# Patient Record
(demographics unavailable — no encounter records)

---

## 2024-10-29 NOTE — PHYSICAL EXAM
[Healthy Appearing] : healthy appearing [No Acute Distress] : no acute distress [Sclera] : the sclera and conjunctiva were normal [Hearing Threshold Finger Rub Not [place]] : hearing was normal [Normal Appearance] : the appearance of the neck was normal [No Respiratory Distress] : no respiratory distress [Normal] : normal bowel sounds, non-tender, no masses, soft, no no hepato-splenomegaly [Abnormal Walk] : normal gait [Normal Color / Pigmentation] : normal skin color and pigmentation [Motor Exam] : the motor exam was normal [Oriented To Time, Place, And Person] : oriented to person, place, and time

## 2024-10-29 NOTE — PHYSICAL EXAM
[Healthy Appearing] : healthy appearing [No Acute Distress] : no acute distress [Sclera] : the sclera and conjunctiva were normal [Hearing Threshold Finger Rub Not [place]] : hearing was normal [Normal Appearance] : the appearance of the neck was normal [No Respiratory Distress] : no respiratory distress [Normal] : normal bowel sounds, non-tender, no masses, soft, no no hepato-splenomegaly [Abnormal Walk] : normal gait [Normal Color / Pigmentation] : normal skin color and pigmentation [Motor Exam] : the motor exam was normal [Oriented To Time, Place, And Person] : oriented to person, place, and time

## 2024-10-30 NOTE — ASSESSMENT
"Anesthesia Release from PACU Note    Patient: Clay Marcos    Procedure(s) Performed: Procedure(s) (LRB):  ESOPHAGOGASTRODUODENOSCOPY (EGD) (N/A)    Anesthesia type: MAC    Post pain: Adequate analgesia    Post assessment: no apparent anesthetic complications, tolerated procedure well and no evidence of recall    Last Vitals:   Visit Vitals    /76 (BP Location: Right leg, Patient Position: Lying, BP Method: Automatic)    Pulse 77    Temp 36.5 °C (97.7 °F) (Oral)    Resp 20    Ht 6' 0.5" (1.842 m)    Wt 107 kg (236 lb)    SpO2 (!) 94%    BMI 31.57 kg/m2       Post vital signs: stable    Level of consciousness: awake    Nausea/Vomiting: no nausea/no vomiting    Complications: none    Airway Patency: patent    Respiratory: unassisted, spontaneous ventilation, room air    Cardiovascular: stable    Hydration: euvolemic  " [FreeTextEntry1] : EGD/Colonoscopy with Dr. Rios (prefers Croatian speaking provider) - Indications: screening, GERD - Reviewed importance of adequate colon cleansing prior to colonoscopy. Instructed to contact office if he/she has any questions regarding prep. - Explained the risks (including but not limited to cardiopulmonary anesthetic complications, bleeding, perforation, aspiration, missed lesions and misidentified sites of lesions - complications that might necessitate hospitalization or surgery), benefits and alternatives of colonoscopy were reviewed with the patient. Preparation for the procedure was reviewed with the patient. The patient was informed that she/he would be given intravenous anesthesia by an anesthesiologist for the procedure. The patient was informed that a family member or friend must drive the patient following recovery from the procedure. Patient understands and agrees, all questions answered. - Holds: none - Prep: Standard  GERD - Start omeprazole once daily.  - Reviewed lifestyle modifications such as a weight loss and dietary changes. Avoid or limit foods that cause symptoms, which may be excessive caffeine, chocolate, alcohol, peppermint, red sauce, spicy foods and fatty foods. Avoid late night eating and snacking. Avoid large meals. -Use OTC antacids/alginates for intermittent symptoms of reflux such as Tums, Reflux Gourmet or Gaviscon.

## 2024-10-30 NOTE — HISTORY OF PRESENT ILLNESS
[FreeTextEntry1] :  Language Line 's Name: Varun 's ID #: [id number] Language: Sao Tomean   Ms. BRIT TEE is a 49 year old female PMH BROEDRICK on CPAP, depression, HLD, migraine.   Patient presents for pre-colonoscopy evaluation. Referred to practice by PCP at [facility].  Last EGD/colonoscopy in 2019 at [facility] (Dx diverticulitis and GERD), notable for colon polyps, was instructed to repeat in 5 years.  Prior CRC screening such as Cologuard, FIT test, CT colonography: No   Denies change in bowel habits, constipation, N/V/D, rectal bleeding, abdominal pain, bloating, unintentional weight loss, early satiety, dysphagia. Denies heartburn or reflux on a regular basis. Reports acid reflux 1-2 times per week, will take omeprazole as needed rather than regularly. She avoids acidic, spicy or fatty foods. Denies hematochezia or melena.  BM every 1-2 days; soft, brown stools that are easily passed.   Family history: Denies family history of colon cancer or advanced colorectal polyps. Maternal grandfather had gastric cancer.  Denies family history of IBD or celiac disease.   Social: Smoking history: Never Alcohol consumption: Occasional Marijuana use: None Other recreational drug use: None

## 2024-10-30 NOTE — HISTORY OF PRESENT ILLNESS
[FreeTextEntry1] :  Language Line 's Name: Varun 's ID #: [id number] Language: Egyptian   Ms. BRIT TEE is a 49 year old female PMH BRODERICK on CPAP, depression, HLD, migraine.   Patient presents for pre-colonoscopy evaluation. Referred to practice by PCP at [facility].  Last EGD/colonoscopy in 2019 at [facility] (Dx diverticulitis and GERD), notable for colon polyps, was instructed to repeat in 5 years.  Prior CRC screening such as Cologuard, FIT test, CT colonography: No   Denies change in bowel habits, constipation, N/V/D, rectal bleeding, abdominal pain, bloating, unintentional weight loss, early satiety, dysphagia. Denies heartburn or reflux on a regular basis. Reports acid reflux 1-2 times per week, will take omeprazole as needed rather than regularly. She avoids acidic, spicy or fatty foods. Denies hematochezia or melena.  BM every 1-2 days; soft, brown stools that are easily passed.   Family history: Denies family history of colon cancer or advanced colorectal polyps. Maternal grandfather had gastric cancer.  Denies family history of IBD or celiac disease.   Social: Smoking history: Never Alcohol consumption: Occasional Marijuana use: None Other recreational drug use: None

## 2024-10-30 NOTE — ASSESSMENT
[FreeTextEntry1] : EGD/Colonoscopy with Dr. Rios (prefers Serbian speaking provider) - Indications: screening, GERD - Reviewed importance of adequate colon cleansing prior to colonoscopy. Instructed to contact office if he/she has any questions regarding prep. - Explained the risks (including but not limited to cardiopulmonary anesthetic complications, bleeding, perforation, aspiration, missed lesions and misidentified sites of lesions - complications that might necessitate hospitalization or surgery), benefits and alternatives of colonoscopy were reviewed with the patient. Preparation for the procedure was reviewed with the patient. The patient was informed that she/he would be given intravenous anesthesia by an anesthesiologist for the procedure. The patient was informed that a family member or friend must drive the patient following recovery from the procedure. Patient understands and agrees, all questions answered. - Holds: none - Prep: Standard  GERD - Start omeprazole once daily.  - Reviewed lifestyle modifications such as a weight loss and dietary changes. Avoid or limit foods that cause symptoms, which may be excessive caffeine, chocolate, alcohol, peppermint, red sauce, spicy foods and fatty foods. Avoid late night eating and snacking. Avoid large meals. -Use OTC antacids/alginates for intermittent symptoms of reflux such as Tums, Reflux Gourmet or Gaviscon.

## 2024-11-19 NOTE — HISTORY OF PRESENT ILLNESS
[Date: ___] : Date of most recent diagnostic polysomnogram: [unfilled] [AHI: ___ per hour] : Apnea-hypopnea index:  [unfilled] per hour [FreeTextEntry1] : 49 year old woman with history of migraine is here in the sleep center to address sleep apnea.  symptoms prior to cpap - Patient is sleepy with Waverly sleepiness score of 14.  Patient has very loud snoring, also has witnessed apneas.  Patient's bedtime is around 10 PM wakes up in the morning around 6 AM.   She feels tired when she wakes up.  Patient drinks 2-4 cups of coffee during the daytime. Patient does not have any headaches or nocturia. She is sleepy while driving. STOPBANG score - 3 neck size - 15 inches  symptoms on the cpap - Patient is not sleepy with Waverly sleepiness score of 4.  Patient does not snore with cpap, no apneas with cpap.  Patient's bedtime is around 10 PM wakes up in the morning around 6 AM.   She feels rested when she wakes up.  Patient drinks 2-4 cups of coffee during the daytime. Patient does not have any headaches or nocturia. She is not sleepy while driving.  Patient continues to have problems using the cpap, She is using less than 4 hrs.

## 2025-01-07 NOTE — HISTORY OF PRESENT ILLNESS
[Improved Health] : Improved health [Quality of Life] : Quality of life [Improved Mobility] : Improved mobility [FreeTextEntry1] : Medical Hx: depression/anxiety, OS (cpap), GERD, Migraine, Dyslipidemia, Bell's palsy  Surgical Hx: ovarian cyst removal    Started struggling with weight about 2 years ago.  Past Wt Loss Attempts: Lifestyle  Topiramate 25 mg BID --neuro for HA Gained about 20 lbs in past yr.    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

## 2025-01-07 NOTE — ASSESSMENT
[FreeTextEntry1] :  Counseled re weight management.  Dietary Modification Education provided. Recommend a diet high in vegetables intake & lean protein, low in carbohydrates. Discussed the healthy plate model and ways to help with portion control.  Physical Activity- recommend aerobic exercise 3 times per week for 30-45 mins, recommend incorporating strength training 3 times per week; has equipment at home. Use youtube videos.  Labs- ordered fasting labs Medication- pt meets criteria to initiate medication treatment of obesity. Consider injectables if covered by insurance.  Discussed action, s/e, contraindications, storage and injection technique.    RTO in 3 weeks

## 2025-01-23 NOTE — PHYSICAL EXAM
[FreeTextEntry1] : General: No acute distress, Awake, Alert.    Mental status   Awake, alert, and oriented to person, time and place, Normal attention span and concentration, Recent and remote memory intact, Language intact, Fund of knowledge intact.   Delayed recall 3/3     Cranial Nerves    II: VFF    III, IV, VI: PERRL, EOMI.   V: Facial sensation is normal B/L.  VII: Slight left NLF flattening  VIII: Gross hearing is intact.    IX, X: Palate is midline and elevates symmetrically.    XI: Trapezius normal strength.    XII: Tongue midline without atrophy or fasciculations.        Motor exam    Muscle tone - no evidence of rigidity or resistance in all 4 extremities.   No atrophy or fasciculations   Muscle Strength: arms and legs, proximal and distal flexors and extensors are normal    No UE drift.       Reflexes   All present, normal, and symmetrical.  Patellar b/l hyperreflexia No clonus, No Hoffmans    Coordination   Finger to nose: Normal.   Heel to shin: Normal.       Sensory   Intact sensation to PP, vibration and cold to arms and legs    Gait   Normal including heels, toes, and tandem gait.  Romberg negative

## 2025-01-23 NOTE — DATA REVIEWED
[de-identified] : MRI Brain 6/11/24-  This exam is compared prior brain MRI performed on December 28, 2023.   The lateral ventricles have a normal contrasted   Prominent cystic area is seen involving the left sublenticular region which could be compatible with a prominent VR space. This appears stable when compared with the prior exam.   No acute hemorrhage mass or mass effect seen.   Cavum vellum interpositum is seen and unchanged   Abnormal area susceptibility involving the high medial right frontal cortex is seen and unchanged. This could be compatible area of old hemorrhage mineralization or small cavernous angiomas   The large vessels demonstrate normal flow voids   The visualized paranasal sinuses mastoid and middle ear regions appear clear.   IMPRESSION: Stable exam.  MRI Brain 12/27/23- Findings: There is no acute infarct. There is no evidence of mass or intracranial hemorrhage. Ventricles and sulci are normal in size and configuration for the patient's stated age. No midline shift or other significant mass effect is noted. Few scattered tiny cerebral white matter FLAIR hyperintensities are nonspecific, although likely due to chronic microangiopathy. Gray-white differentiation is maintained throughout. A prominent perivascular space is seen in the left basal ganglia. Normal flow voids are maintained in the dominant arterial and venous structures.  There is a trace right mastoid air cell effusion, the remaining paranasal sinuses and tympanomastoid spaces are clear. Orbits and orbital contents are unremarkable.  IMPRESSION:  No acute infarct. [de-identified] : 3/5/24- At home sleep study showed evidence of mild degree of obstructive sleep apnea  MRI Cervical Spine 3/18/24- FINDINGS: The cervical cord is normal in signal. Vertebral body heights maintained. There is straightening of the normal cervical lordosis. Alignment is otherwise normal.   There are minimal disc bulges from C4-C5 through C6/C7. There are no disc herniations at any level. There is no spinal canal or foraminal narrowing at any level.   There is no paraspinal muscle atrophy or edema.   IMPRESSION: Minimal disc bulges from C4-C5 through C6/C7. No disc herniations. No spinal canal or foraminal narrowing at any level.  12/27/23- CT Head- FINDINGS: A 7 mm hypodensity within left basal ganglia which appears have been present on the study of 11/16/2013 is consistent with a perivascular space. No large areas of infarction appreciated. No evidence of acute intracranial hemorrhage. No midline shift or focal mass effect. Basal cisterns are patent. Globes symmetric and intact. No retrobulbar hematoma or proptosis. Paranasal sinuses and mastoids are well-pneumatized. The calvaria is intact.  IMPRESSION: A 7 mm hypodense focus within the left basal ganglia was present on a prior CT head study dating back to 11/16/2013 and is consistent with a perivascular space.  No intracranial hemorrhage, midline shift or hydrocephalus.  CTA Head/ Neck- FINDINGS:  CTA NECK: The right common carotid artery is normal in course and caliber. There is no hemodynamically significant stenosis of the right internal carotid artery. The right external carotid artery is also patent.  The left common carotid artery is normal in course and caliber. There is no hemodynamically significant stenosis of the left internal carotid artery. The left external carotid artery is also patent.  The extracranial segments of the  vertebral arteries are patent.   CTA HEAD: The bilateral internal carotid arteries are patent. The middle cerebral and anterior cerebral arteries are patent.   Prominent bilateral posterior communicating arteries supply the bilateral PCA territory.  Distal bilateral vertebral arteries are patent. There is a diminutive basilar artery. The basilar artery gives out right and left SCA branches and a hypoplastic right P1 segment as the right and left PCA territory is supplied by prominent bilateral posterior communicating arteries consistent with persistent bilateral fetal PCAs.  Included dural venous sinuses are well opacified with contrast. Arachnoid granulation at the right transverse sinus is noted.  The included portions of the cerebral parenchyma are grossly normal in appearance.  IMPRESSION: No high-grade steno-occlusive disease.  No evidence of arterial dissection.   12/28/23- TTE  CONCLUSIONS:  1. Left ventricular systolic function is normal with an ejection fraction visually estimated at 60 to 65 %. 2. Normal left ventricular diastolic function. 3. Normal right ventricular cavity size and systolic function. 4. The left atrium is normal. 5. The right atrium is normal in size. 6. No significant valvular disease. 7. No pericardial effusion seen. 8. Agitated saline injection was negative for intracardiac shunt. 9. No prior echocardiogram is available for comparison.  Labs- 12/27/23- , triglycerides 207 Hemoglobin A1C 5.9

## 2025-01-23 NOTE — HISTORY OF PRESENT ILLNESS
[FreeTextEntry1] : 2/5/24 Carlos Manuel Christine is a 49 yo F with PMH of neurocysticercosis (2007), cerebral hemangioma, migraines, and depression here for hospital follow-up. Accompanied with daughter who is  for Chinese- offered to use licensed  but prefers to use family for this visit. Presented to [facility] on 12/27 for right facial pain, tingling and left facial droop. Patient started to develop right facial pain and tingling about 5 days prior with worsening headache to right side, the night prior recalls right side of her face started to increase in intensity and also felt swollen, paresthesia radiate down into her right arm.  No weakness in extremities.  CT head shows no acute intracranial pathology, CT angiogram head and neck showed no LVO, CT perfusion showed no perfusion deficit.  On exam, patient has partial facial droop of right face with paresthesia of V2 and V3 segments on the right face as per hospital note, as per pt reports facial droop was on left however paresthesia was on right.  At the time reported having mild right eye irritation and watering which has resolved at this time.  Denies taste changes, speech difficulty, weakness in extremities, gait instability, change or loss in vision, fevers.   MRI with no acute infarct.  Discharged on valtrex and steroid titration pack for suspected bells palsy. Tingling sensation has resolved, continues to have slight left facial asymmetry and decreased sensation to right V1 and V2 distribution. Continues to have headaches primarily right frontal that typically start upon waking up in the morning, at times radiates to back of head.  Almost on daily basis, severity varies.  At times with nausea, no vomiting.  Dizziness and blurry vision when headaches are severe. Aura with twinkling lights, described as specs flashing.  Headache age onset 15 yo, in the past has been on preventative medication (can not recall names of medications tried).  Taking tylenol for breakthrough pain with minimal relief. Triggers include stress and bright lights.  Alleviating factors include dark and quiet rooms. Water intake 6-8 glasses daily.  Sleep 6-8 hours nightly,  + headache, + fatigue, + dry mouth. Chronic neck pain, radiates into shoulders at times.  Denies radiation into arms.  Denies fall / injury. Diet- needs improvement.  Exercise just started weekly regimen with walking and cycling 3-4x weekly. Family hx mother had stroke in 40's, Father had stroke in 80's.   BP at goal in office. No alcohol or drug use, no smoking.    4/9/24  used Headaches well managed with topiramate 50mg nightly. At home sleep study showed evidence of mild degree of obstructive sleep apnea - sleep specialist scheduled next month. MRI Cervical Spine showed minimal disc bulges from C4-C5 through C6/C7. No disc herniations. No spinal canal or foraminal narrowing at any level.  Neck pain continues on left sided primarily radiating into left shoulder with intermittent paresthesia.  Worse at the end of the day.  Alleviated with massage and rest.   Denies weakness, numbness, blurry/ loss in vision, speech difficulty, dizziness, or gait instability.   7/22/24  used. Headaches continue to be well managed with topiramate 50mg nightly. Following with MD Fitzgerald regarding left basal ganglia hypodensity.  Scheduled to have interval follow-up imaging to monitor.  MRI in June was stable. Seen by sleep specialist MD Fontaine who recommended CPAP, which she is compliant with.  Reports feeling of lightheadedness/ dizziness that last minutes starting 1 month ago approximately 1-2 x weekly, usually develops cold sweat with symptoms and at times feels nauseous.  She is unsure if symptoms are associated with headache.  No visual disturbances.  Typically drinks seltzer and symptoms resolve.  No room spinning, ringing in ears, ear fullness, speech disturbances or numbness/ weakness.  No chest pain, palpitations, or SOB.  Denies aggravating factors such as positional changes, valsalva maneuvers, certain time of day, head movements.  Recently increased sertraline to 75mg, 6 weeks ago which she takes in the evenings with topiramate.   Remaining neurologic ROS is negative.  1/22/25  used. Headaches well managed with topiramate 50mg daily. She does report having intermittent dizziness described as room spinning that last minutes, started 1 month ago  - - however last visit she did mention similar episodes.  At that time recommended to see cardiology but did not schedule as of yet. Denies tinnitus, hearing loss, nausea/vomiting,  syncope, vision changes such as diplopia, blurry vision or vision loss, jaw pain, numbness, or weakness. No identified triggering or alleviating factors such as positional changes. She states dizziness can occur while laying down or walking.  Water intake could use improvement. No recent fall or injury. We discussed last visit possibly talking to PCP regarding if increase in zoloft could be cause, she states discussed and recommended to switch taking medication from evening to daytime but did not alter dose.  Separate of this waking up with feeling hot resulting in sweating periodically, she does report menstrual cycle is not as consistent as previously.  No fevers, muscle pains, lethargy, or confusion. Recently established care with obesity doctor who recommended starting dietary modifications and metformin. Most recent lipid panel LDL and TG elevated  - - compliant with medications (atorvastatin 20mg), wishes to first continue dietary modifications prior to increasing statin medication.  Recently stopped red meat, limiting fried foods. cheese, rice, tortilla, pastas. She is working on getting use to CPAP machine - -  trouble tolerating mask increases her stress reports approximately using 4-5 hours nightly. BP at goal.

## 2025-01-23 NOTE — HISTORY OF PRESENT ILLNESS
[FreeTextEntry1] : 2/5/24 Carlos Manuel Christine is a 49 yo F with PMH of neurocysticercosis (2007), cerebral hemangioma, migraines, and depression here for hospital follow-up. Accompanied with daughter who is  for [place]- offered to use licensed  but prefers to use family for this visit. Presented to [facility] on 12/27 for right facial pain, tingling and left facial droop. Patient started to develop right facial pain and tingling about 5 days prior with worsening headache to right side, the night prior recalls right side of her face started to increase in intensity and also felt swollen, paresthesia radiate down into her right arm.  No weakness in extremities.  CT head shows no acute intracranial pathology, CT angiogram head and neck showed no LVO, CT perfusion showed no perfusion deficit.  On exam, patient has partial facial droop of right face with paresthesia of V2 and V3 segments on the right face as per hospital note, as per pt reports facial droop was on left however paresthesia was on right.  At the time reported having mild right eye irritation and watering which has resolved at this time.  Denies taste changes, speech difficulty, weakness in extremities, gait instability, change or loss in vision, fevers.   MRI with no acute infarct.  Discharged on valtrex and steroid titration pack for suspected bells palsy. Tingling sensation has resolved, continues to have slight left facial asymmetry and decreased sensation to right V1 and V2 distribution. Continues to have headaches primarily right frontal that typically start upon waking up in the morning, at times radiates to back of head.  Almost on daily basis, severity varies.  At times with nausea, no vomiting.  Dizziness and blurry vision when headaches are severe. Aura with twinkling lights, described as specs flashing.  Headache age onset 15 yo, in the past has been on preventative medication (can not recall names of medications tried).  Taking tylenol for breakthrough pain with minimal relief. Triggers include stress and bright lights.  Alleviating factors include dark and quiet rooms. Water intake 6-8 glasses daily.  Sleep 6-8 hours nightly,  + headache, + fatigue, + dry mouth. Chronic neck pain, radiates into shoulders at times.  Denies radiation into arms.  Denies fall / injury. Diet- needs improvement.  Exercise just started weekly regimen with walking and cycling 3-4x weekly. Family hx mother had stroke in 40's, Father had stroke in 80's.   BP at goal in office. No alcohol or drug use, no smoking.    4/9/24  used Headaches well managed with topiramate 50mg nightly. At home sleep study showed evidence of mild degree of obstructive sleep apnea - sleep specialist scheduled next month. MRI Cervical Spine showed minimal disc bulges from C4-C5 through C6/C7. No disc herniations. No spinal canal or foraminal narrowing at any level.  Neck pain continues on left sided primarily radiating into left shoulder with intermittent paresthesia.  Worse at the end of the day.  Alleviated with massage and rest.   Denies weakness, numbness, blurry/ loss in vision, speech difficulty, dizziness, or gait instability.   7/22/24  used. Headaches continue to be well managed with topiramate 50mg nightly. Following with MD Fitzgerald regarding left basal ganglia hypodensity.  Scheduled to have interval follow-up imaging to monitor.  MRI in June was stable. Seen by sleep specialist MD Fontaine who recommended CPAP, which she is compliant with.  Reports feeling of lightheadedness/ dizziness that last minutes starting 1 month ago approximately 1-2 x weekly, usually develops cold sweat with symptoms and at times feels nauseous.  She is unsure if symptoms are associated with headache.  No visual disturbances.  Typically drinks seltzer and symptoms resolve.  No room spinning, ringing in ears, ear fullness, speech disturbances or numbness/ weakness.  No chest pain, palpitations, or SOB.  Denies aggravating factors such as positional changes, valsalva maneuvers, certain time of day, head movements.  Recently increased sertraline to 75mg, 6 weeks ago which she takes in the evenings with topiramate.   Remaining neurologic ROS is negative.  1/22/25  used. Headaches well managed with topiramate 50mg daily. She does report having intermittent dizziness described as room spinning that last minutes, started 1 month ago  - - however last visit she did mention similar episodes.  At that time recommended to see cardiology but did not schedule as of yet. Denies tinnitus, hearing loss, nausea/vomiting,  syncope, vision changes such as diplopia, blurry vision or vision loss, jaw pain, numbness, or weakness. No identified triggering or alleviating factors such as positional changes. She states dizziness can occur while laying down or walking.  Water intake could use improvement. No recent fall or injury. We discussed last visit possibly talking to PCP regarding if increase in zoloft could be cause, she states discussed and recommended to switch taking medication from evening to daytime but did not alter dose.  Separate of this waking up with feeling hot resulting in sweating periodically, she does report menstrual cycle is not as consistent as previously.  No fevers, muscle pains, lethargy, or confusion. Recently established care with obesity doctor who recommended starting dietary modifications and metformin. Most recent lipid panel LDL and TG elevated  - - compliant with medications (atorvastatin 20mg), wishes to first continue dietary modifications prior to increasing statin medication.  Recently stopped red meat, limiting fried foods. cheese, rice, tortilla, pastas. She is working on getting use to CPAP machine - -  trouble tolerating mask increases her stress reports approximately using 4-5 hours nightly. BP at goal.

## 2025-01-23 NOTE — ASSESSMENT
[FreeTextEntry1] : Carlos Manuel Momin is a 50 yo F with PMH of neurocysticercosis, cerebral hemangioma, migraines, and depression here for follow-up.  Presented to [facility] for right facial pain, tingling, and droop. CTH shows no acute intracranial pathology, CTA shows no stenosis or occlusion, and CTP shows no perfusion deficit   MRI with no evidence of acute infarction.  High suspicion of [place] palsy given preceding face and jaw pain and watering of her eye. With stroke in mother at a young age and distribution of symptoms more consistent with upper motor neuron lesion.  Discharged on valtrex and steroids which she has completed at this time.  Started on topiramate 50mg for headache prevention, well managed at this time.  Here today for continued episodes of lightheadedness that last minutes, at times described as room spinning. No ringing in ears, disequilibrium, gait instability, or visual disturbances.   Last MRI Brain in June with no change to 7 mm hypodense focus within the left basal ganglia which she is currently following neurosurgery for.  If symptoms persist may warrant repeat MRI Brain.   I suggested to trial vestibular therapy  Will check B12, folate, CBC, CMP, ferritin, vitamin D, copper and zinc  Encouraged to follow-up with PCP regarding symptom development after increase in dosage of zoloft as possible adverse effect  New cardiology referral placed - - encouraged to make appointment  recommended to follow up with GYN with possible pre menopausal symptoms of hot flashes      continue ASA 81mg for stroke prevention  BP at goal in office today <130/80 LDL 78-->125, on atorvastatin 20mg -  reluctant to increase statin medication wishes to continue to work on dietary modifications and repeat lab work in upcoming week.   For headache management continue topiramate 50mg daily, can separate dose to 1 tablet in morning and 1 tablet in evening  Encouraged to stay hydrated with drinking 2L water daily Continue exercise regimen Encouraged dietary modifications with Mediterranean style diet - - following with dietician Encouraged compliance with CPAP, if unable to tolerate recommended to return to sleep specialist to discuss potential alternative options Continue to follow with neurosurgery for 7 mm hypodense focus within the left basal ganglia    Follow up in 3 months

## 2025-01-23 NOTE — ASSESSMENT
[FreeTextEntry1] : Carlos Manuel Momin is a 48 yo F with PMH of neurocysticercosis, cerebral hemangioma, migraines, and depression here for follow-up.  Presented to [facility] for right facial pain, tingling, and droop. CTH shows no acute intracranial pathology, CTA shows no stenosis or occlusion, and CTP shows no perfusion deficit   MRI with no evidence of acute infarction.  High suspicion of [place] palsy given preceding face and jaw pain and watering of her eye. With stroke in mother at a young age and distribution of symptoms more consistent with upper motor neuron lesion.  Discharged on valtrex and steroids which she has completed at this time.  Started on topiramate 50mg for headache prevention, well managed at this time.  Here today for continued episodes of lightheadedness that last minutes, at times described as room spinning. No ringing in ears, disequilibrium, gait instability, or visual disturbances.   Last MRI Brain in June with no change to 7 mm hypodense focus within the left basal ganglia which she is currently following neurosurgery for.  If symptoms persist may warrant repeat MRI Brain.   I suggested to trial vestibular therapy  Will check B12, folate, CBC, CMP, ferritin, vitamin D, copper and zinc  Encouraged to follow-up with PCP regarding symptom development after increase in dosage of zoloft as possible adverse effect  New cardiology referral placed - - encouraged to make appointment  recommended to follow up with GYN with possible pre menopausal symptoms of hot flashes      continue ASA 81mg for stroke prevention  BP at goal in office today <130/80 LDL 78-->125, on atorvastatin 20mg -  reluctant to increase statin medication wishes to continue to work on dietary modifications and repeat lab work in upcoming week.   For headache management continue topiramate 50mg daily, can separate dose to 1 tablet in morning and 1 tablet in evening  Encouraged to stay hydrated with drinking 2L water daily Continue exercise regimen Encouraged dietary modifications with Mediterranean style diet - - following with dietician Encouraged compliance with CPAP, if unable to tolerate recommended to return to sleep specialist to discuss potential alternative options Continue to follow with neurosurgery for 7 mm hypodense focus within the left basal ganglia    Follow up in 3 months

## 2025-01-23 NOTE — DATA REVIEWED
[de-identified] : MRI Brain 6/11/24-  This exam is compared prior brain MRI performed on December 28, 2023.   The lateral ventricles have a normal contrasted   Prominent cystic area is seen involving the left sublenticular region which could be compatible with a prominent VR space. This appears stable when compared with the prior exam.   No acute hemorrhage mass or mass effect seen.   Cavum vellum interpositum is seen and unchanged   Abnormal area susceptibility involving the high medial right frontal cortex is seen and unchanged. This could be compatible area of old hemorrhage mineralization or small cavernous angiomas   The large vessels demonstrate normal flow voids   The visualized paranasal sinuses mastoid and middle ear regions appear clear.   IMPRESSION: Stable exam.  MRI Brain 12/27/23- Findings: There is no acute infarct. There is no evidence of mass or intracranial hemorrhage. Ventricles and sulci are normal in size and configuration for the patient's stated age. No midline shift or other significant mass effect is noted. Few scattered tiny cerebral white matter FLAIR hyperintensities are nonspecific, although likely due to chronic microangiopathy. Gray-white differentiation is maintained throughout. A prominent perivascular space is seen in the left basal ganglia. Normal flow voids are maintained in the dominant arterial and venous structures.  There is a trace right mastoid air cell effusion, the remaining paranasal sinuses and tympanomastoid spaces are clear. Orbits and orbital contents are unremarkable.  IMPRESSION:  No acute infarct. [de-identified] : 3/5/24- At home sleep study showed evidence of mild degree of obstructive sleep apnea  MRI Cervical Spine 3/18/24- FINDINGS: The cervical cord is normal in signal. Vertebral body heights maintained. There is straightening of the normal cervical lordosis. Alignment is otherwise normal.   There are minimal disc bulges from C4-C5 through C6/C7. There are no disc herniations at any level. There is no spinal canal or foraminal narrowing at any level.   There is no paraspinal muscle atrophy or edema.   IMPRESSION: Minimal disc bulges from C4-C5 through C6/C7. No disc herniations. No spinal canal or foraminal narrowing at any level.  12/27/23- CT Head- FINDINGS: A 7 mm hypodensity within left basal ganglia which appears have been present on the study of 11/16/2013 is consistent with a perivascular space. No large areas of infarction appreciated. No evidence of acute intracranial hemorrhage. No midline shift or focal mass effect. Basal cisterns are patent. Globes symmetric and intact. No retrobulbar hematoma or proptosis. Paranasal sinuses and mastoids are well-pneumatized. The calvaria is intact.  IMPRESSION: A 7 mm hypodense focus within the left basal ganglia was present on a prior CT head study dating back to 11/16/2013 and is consistent with a perivascular space.  No intracranial hemorrhage, midline shift or hydrocephalus.  CTA Head/ Neck- FINDINGS:  CTA NECK: The right common carotid artery is normal in course and caliber. There is no hemodynamically significant stenosis of the right internal carotid artery. The right external carotid artery is also patent.  The left common carotid artery is normal in course and caliber. There is no hemodynamically significant stenosis of the left internal carotid artery. The left external carotid artery is also patent.  The extracranial segments of the  vertebral arteries are patent.   CTA HEAD: The bilateral internal carotid arteries are patent. The middle cerebral and anterior cerebral arteries are patent.   Prominent bilateral posterior communicating arteries supply the bilateral PCA territory.  Distal bilateral vertebral arteries are patent. There is a diminutive basilar artery. The basilar artery gives out right and left SCA branches and a hypoplastic right P1 segment as the right and left PCA territory is supplied by prominent bilateral posterior communicating arteries consistent with persistent bilateral fetal PCAs.  Included dural venous sinuses are well opacified with contrast. Arachnoid granulation at the right transverse sinus is noted.  The included portions of the cerebral parenchyma are grossly normal in appearance.  IMPRESSION: No high-grade steno-occlusive disease.  No evidence of arterial dissection.   12/28/23- TTE  CONCLUSIONS:  1. Left ventricular systolic function is normal with an ejection fraction visually estimated at 60 to 65 %. 2. Normal left ventricular diastolic function. 3. Normal right ventricular cavity size and systolic function. 4. The left atrium is normal. 5. The right atrium is normal in size. 6. No significant valvular disease. 7. No pericardial effusion seen. 8. Agitated saline injection was negative for intracardiac shunt. 9. No prior echocardiogram is available for comparison.  Labs- 12/27/23- , triglycerides 207 Hemoglobin A1C 5.9

## 2025-01-23 NOTE — REASON FOR VISIT
[Follow-Up: _____] : a [unfilled] follow-up visit [Language Line ] : provided by Language Line   [Interpreters_IDNumber] : [id number] [Interpreters_FullName] : Bjorn [TWNoteComboBox1] : [place]

## 2025-02-25 NOTE — HISTORY OF PRESENT ILLNESS
[FreeTextEntry1] : Medical Hx: depression/anxiety, OS (cpap), GERD, Migraine, Dyslipidemia, Bell's palsy  Surgical Hx: ovarian cyst removal    Started struggling with weight about 2 years ago.  Past Wt Loss Attempts: Lifestyle  Topiramate 25 mg BID --neuro for HA Gained about 20 lbs in past yr.    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 Bike/elliptical 3-4 times per wk; weights 3 days per week. B-eggs with veggies, L/D chicken, veggies, occasional tortilla. Less bread or tortilla. Less cheese. No soda/juice. Sleeping better. Plans to travel to visit for parents for 2 wks.

## 2025-02-25 NOTE — ASSESSMENT
[FreeTextEntry1] :  Continue with Metformin XR 1000 mg daily Continue with healthy eating and physical activity Discussed strategies to stay on track during her trip--daily walks, go to [email or web address] to set herself up for the 14 days.  RTO 6 wks

## 2025-05-20 NOTE — ASSESSMENT
[FreeTextEntry1] :   Increase Metformin to 1,500 mg daily Continue with physical activity Increase protein intake at meals to aid with satiety  RTO 6 weeks

## 2025-05-20 NOTE — HISTORY OF PRESENT ILLNESS
[FreeTextEntry1] : Medical Hx: depression/anxiety, BRODERICK (cpap), GERD, Migraine, Dyslipidemia, Bell's palsy  Surgical Hx: ovarian cyst removal    Started struggling with weight about 2 years ago.  Past Wt Loss Attempts: Lifestyle  Topiramate 25 mg BID --neuro for HA Gained about 20 lbs in past yr.    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 Bike/elliptical 3-4 times per wk; weights 3 days per week. B-eggs with veggies, L/D chicken, veggies, occasional tortilla. Less bread or tortilla. Less cheese. No soda/juice. Sleeping better. Plans to travel to visit for parents for 2 wks.   4/8/25:  Metformin  mg 2 tabs with lunch. Walking, bike and elliptical--5 times per wk; weights 3 times per wk. Careful with diet.  Eating whole foods, more veggies.   5/20/25:  Metformin  mg 2 tabs with lunch. Protein shake, WW slice with PB, salad with chicken. Less bread/tortilla. Continues her workouts.  Tanita: Fat% 43.4>42.7%, Muscle mass 110.2>106.8lbs, Bone Mass 5.8>5.6 lbs, BMR 1618>1564, Visceral fat: 12>11

## 2025-06-10 NOTE — HISTORY OF PRESENT ILLNESS
[FreeTextEntry1] : 6/10/25 Headaches continue to be well managed with topiramate 50mg daily. Previously reported intermittent dizziness which I referred for vestibular therapy which evaluation did not feel she exhibited signs of BPPV but did refer her to PT to address neck pain and posture.  She admits canceled or missed many appointments due to symptoms improving and no reoccurrences of dizziness. She did switch her zoloft to daytime which she contributes to improvement of symptoms. Reports feeling well with exercising 3-4 x weekly and eating healthy well-balanced diet. She does report neck pain improves when she does not utilize CPAP machine due to not having her head in a fixed position. Hydrating adequately with water daily.    1/22/25  used. Headaches well managed with topiramate 50mg daily. She does report having intermittent dizziness described as room spinning that last minutes, started 1 month ago  - - however last visit she did mention similar episodes.  At that time recommended to see cardiology but did not schedule as of yet. Denies tinnitus, hearing loss, nausea/vomiting,  syncope, vision changes such as diplopia, blurry vision or vision loss, jaw pain, numbness, or weakness. No identified triggering or alleviating factors such as positional changes. She states dizziness can occur while laying down or walking.  Water intake could use improvement. No recent fall or injury. We discussed last visit possibly talking to PCP regarding if increase in zoloft could be cause, she states discussed and recommended to switch taking medication from evening to daytime but did not alter dose.  Separate of this waking up with feeling hot resulting in sweating periodically, she does report menstrual cycle is not as consistent as previously.  No fevers, muscle pains, lethargy, or confusion. Recently established care with obesity doctor who recommended starting dietary modifications and metformin. Most recent lipid panel LDL and TG elevated  - - compliant with medications (atorvastatin 20mg), wishes to first continue dietary modifications prior to increasing statin medication.  Recently stopped red meat, limiting fried foods. cheese, rice, tortilla, pastas. She is working on getting use to CPAP machine - -  trouble tolerating mask increases her stress reports approximately using 4-5 hours nightly. BP at goal.   7/22/24  used. Headaches continue to be well managed with topiramate 50mg nightly. Following with MD Fitzgerald regarding left basal ganglia hypodensity.  Scheduled to have interval follow-up imaging to monitor.  MRI in June was stable. Seen by sleep specialist MD Fontaine who recommended CPAP, which she is compliant with.  Reports feeling of lightheadedness/ dizziness that last minutes starting 1 month ago approximately 1-2 x weekly, usually develops cold sweat with symptoms and at times feels nauseous.  She is unsure if symptoms are associated with headache.  No visual disturbances.  Typically drinks seltzer and symptoms resolve.  No room spinning, ringing in ears, ear fullness, speech disturbances or numbness/ weakness.  No chest pain, palpitations, or SOB.  Denies aggravating factors such as positional changes, valsalva maneuvers, certain time of day, head movements.  Recently increased sertraline to 75mg, 6 weeks ago which she takes in the evenings with topiramate.   Remaining neurologic ROS is negative.  4/9/24  used Headaches well managed with topiramate 50mg nightly. At home sleep study showed evidence of mild degree of obstructive sleep apnea - sleep specialist scheduled next month. MRI Cervical Spine showed minimal disc bulges from C4-C5 through C6/C7. No disc herniations. No spinal canal or foraminal narrowing at any level.  Neck pain continues on left sided primarily radiating into left shoulder with intermittent paresthesia.  Worse at the end of the day.  Alleviated with massage and rest.   Denies weakness, numbness, blurry/ loss in vision, speech difficulty, dizziness, or gait instability.   2/5/24 Carlos Manuel Christine is a 49 yo F with PMH of neurocysticercosis (2007), cerebral hemangioma, migraines, and depression here for hospital follow-up. Accompanied with daughter who is  for [place]- offered to use licensed  but prefers to use family for this visit. Presented to [facility] on 12/27 for right facial pain, tingling and left facial droop. Patient started to develop right facial pain and tingling about 5 days prior with worsening headache to right side, the night prior recalls right side of her face started to increase in intensity and also felt swollen, paresthesia radiate down into her right arm.  No weakness in extremities.  CT head shows no acute intracranial pathology, CT angiogram head and neck showed no LVO, CT perfusion showed no perfusion deficit.  On exam, patient has partial facial droop of right face with paresthesia of V2 and V3 segments on the right face as per hospital note, as per pt reports facial droop was on left however paresthesia was on right.  At the time reported having mild right eye irritation and watering which has resolved at this time.  Denies taste changes, speech difficulty, weakness in extremities, gait instability, change or loss in vision, fevers.   MRI with no acute infarct.  Discharged on valtrex and steroid titration pack for suspected bells palsy. Tingling sensation has resolved, continues to have slight left facial asymmetry and decreased sensation to right V1 and V2 distribution. Continues to have headaches primarily right frontal that typically start upon waking up in the morning, at times radiates to back of head.  Almost on daily basis, severity varies.  At times with nausea, no vomiting.  Dizziness and blurry vision when headaches are severe. Aura with twinkling lights, described as specs flashing.  Headache age onset 15 yo, in the past has been on preventative medication (can not recall names of medications tried).  Taking tylenol for breakthrough pain with minimal relief. Triggers include stress and bright lights.  Alleviating factors include dark and quiet rooms. Water intake 6-8 glasses daily.  Sleep 6-8 hours nightly,  + headache, + fatigue, + dry mouth. Chronic neck pain, radiates into shoulders at times.  Denies radiation into arms.  Denies fall / injury. Diet- needs improvement.  Exercise just started weekly regimen with walking and cycling 3-4x weekly. Family hx mother had stroke in 40's, Father had stroke in 80's.   BP at goal in office. No alcohol or drug use, no smoking.

## 2025-06-10 NOTE — ASSESSMENT
[FreeTextEntry1] : Carlos Manuel Momin is a 49 yo F with PMH of neurocysticercosis, cerebral hemangioma, migraines, and depression here for follow-up.  Initially presented to [facility] for right facial pain, tingling, and droop. CTH shows no acute intracranial pathology, CTA shows no stenosis or occlusion, and CTP shows no perfusion deficit   MRI with no evidence of acute infarction.  High suspicion of [place] palsy given preceding face and jaw pain and watering of her eye. With stroke in mother at a young age and distribution of symptoms more consistent with upper motor neuron lesion.  Discharged on valtrex and steroids which she completed with improvement of symptoms.  Started on topiramate 50mg for headache prevention, well managed at this time.      BP at goal in office today <130/80 LDL 78-->125, on atorvastatin 20mg -  reluctant to increase statin medication wishes to continue to work on dietary modifications and repeat lab work in upcoming week.   For headache management continue topiramate 50mg at bedtime  Encouraged to stay hydrated with drinking 2L water daily Continue exercise regimen Encouraged dietary modifications with Mediterranean style diet - - following with dietician Encouraged compliance with CPAP, if unable to tolerate recommended to return to sleep specialist to discuss potential alternative options Continue to follow with neurosurgery for 7 mm hypodense focus within the left basal ganglia  continue to supplement with OTC Vitamin D3 for vitamin D deficiency    Follow up in 6 months

## 2025-06-10 NOTE — DATA REVIEWED
[de-identified] : MRI Brain 6/11/24-  This exam is compared prior brain MRI performed on December 28, 2023.   The lateral ventricles have a normal contrasted   Prominent cystic area is seen involving the left sublenticular region which could be compatible with a prominent VR space. This appears stable when compared with the prior exam.   No acute hemorrhage mass or mass effect seen.   Cavum vellum interpositum is seen and unchanged   Abnormal area susceptibility involving the high medial right frontal cortex is seen and unchanged. This could be compatible area of old hemorrhage mineralization or small cavernous angiomas   The large vessels demonstrate normal flow voids   The visualized paranasal sinuses mastoid and middle ear regions appear clear.   IMPRESSION: Stable exam.  MRI Brain 12/27/23- Findings: There is no acute infarct. There is no evidence of mass or intracranial hemorrhage. Ventricles and sulci are normal in size and configuration for the patient's stated age. No midline shift or other significant mass effect is noted. Few scattered tiny cerebral white matter FLAIR hyperintensities are nonspecific, although likely due to chronic microangiopathy. Gray-white differentiation is maintained throughout. A prominent perivascular space is seen in the left basal ganglia. Normal flow voids are maintained in the dominant arterial and venous structures.  There is a trace right mastoid air cell effusion, the remaining paranasal sinuses and tympanomastoid spaces are clear. Orbits and orbital contents are unremarkable.  IMPRESSION:  No acute infarct. [de-identified] : 3/5/24- At home sleep study showed evidence of mild degree of obstructive sleep apnea  MRI Cervical Spine 3/18/24- FINDINGS: The cervical cord is normal in signal. Vertebral body heights maintained. There is straightening of the normal cervical lordosis. Alignment is otherwise normal.   There are minimal disc bulges from C4-C5 through C6/C7. There are no disc herniations at any level. There is no spinal canal or foraminal narrowing at any level.   There is no paraspinal muscle atrophy or edema.   IMPRESSION: Minimal disc bulges from C4-C5 through C6/C7. No disc herniations. No spinal canal or foraminal narrowing at any level.  12/27/23- CT Head- FINDINGS: A 7 mm hypodensity within left basal ganglia which appears have been present on the study of 11/16/2013 is consistent with a perivascular space. No large areas of infarction appreciated. No evidence of acute intracranial hemorrhage. No midline shift or focal mass effect. Basal cisterns are patent. Globes symmetric and intact. No retrobulbar hematoma or proptosis. Paranasal sinuses and mastoids are well-pneumatized. The calvaria is intact.  IMPRESSION: A 7 mm hypodense focus within the left basal ganglia was present on a prior CT head study dating back to 11/16/2013 and is consistent with a perivascular space.  No intracranial hemorrhage, midline shift or hydrocephalus.  CTA Head/ Neck- FINDINGS:  CTA NECK: The right common carotid artery is normal in course and caliber. There is no hemodynamically significant stenosis of the right internal carotid artery. The right external carotid artery is also patent.  The left common carotid artery is normal in course and caliber. There is no hemodynamically significant stenosis of the left internal carotid artery. The left external carotid artery is also patent.  The extracranial segments of the  vertebral arteries are patent.   CTA HEAD: The bilateral internal carotid arteries are patent. The middle cerebral and anterior cerebral arteries are patent.   Prominent bilateral posterior communicating arteries supply the bilateral PCA territory.  Distal bilateral vertebral arteries are patent. There is a diminutive basilar artery. The basilar artery gives out right and left SCA branches and a hypoplastic right P1 segment as the right and left PCA territory is supplied by prominent bilateral posterior communicating arteries consistent with persistent bilateral fetal PCAs.  Included dural venous sinuses are well opacified with contrast. Arachnoid granulation at the right transverse sinus is noted.  The included portions of the cerebral parenchyma are grossly normal in appearance.  IMPRESSION: No high-grade steno-occlusive disease.  No evidence of arterial dissection.   12/28/23- TTE  CONCLUSIONS:  1. Left ventricular systolic function is normal with an ejection fraction visually estimated at 60 to 65 %. 2. Normal left ventricular diastolic function. 3. Normal right ventricular cavity size and systolic function. 4. The left atrium is normal. 5. The right atrium is normal in size. 6. No significant valvular disease. 7. No pericardial effusion seen. 8. Agitated saline injection was negative for intracardiac shunt. 9. No prior echocardiogram is available for comparison.  Labs- 12/27/23- , triglycerides 207 Hemoglobin A1C 5.9

## 2025-07-08 NOTE — ASSESSMENT
[FreeTextEntry1] :  -15 lbs total, -6 lbs from last visit  Remain on same Metformin dose Continue with healthy eating and exercise  RTO 6 wks

## 2025-07-08 NOTE — HISTORY OF PRESENT ILLNESS
[FreeTextEntry1] : Medical Hx: depression/anxiety, BRODERICK (cpap), GERD, Migraine, Dyslipidemia, Bell's palsy  Surgical Hx: ovarian cyst removal    Started struggling with weight about 2 years ago.  Past Wt Loss Attempts: Lifestyle  Topiramate 25 mg BID --neuro for HA Gained about 20 lbs in past yr.    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 Bike/elliptical 3-4 times per wk; weights 3 days per week. B-eggs with veggies, L/D chicken, veggies, occasional tortilla. Less bread or tortilla. Less cheese. No soda/juice. Sleeping better. Plans to travel to visit for parents for 2 wks.   4/8/25:  Metformin  mg 2 tabs with lunch. Walking, bike and elliptical--5 times per wk; weights 3 times per wk. Careful with diet.  Eating whole foods, more veggies.   5/20/25:  Metformin  mg 2 tabs with lunch. Protein shake, WW slice with PB, salad with chicken. Less bread/tortilla. Continues her workouts.  [place]: Fat% 43.4>42.7%, Muscle mass 110.2>106.8lbs, Bone Mass 5.8>5.6 lbs, BMR 1618>1564, Visceral fat: 12>11  7/8/25: Metformin XR 1500 mg daily; tolerating well. Protein shake (veggies & fruit), Less rice and tortilla. Walking with her dog. Working out at home-bike, elliptical, weights. Sleeping better.